# Patient Record
Sex: MALE | Race: WHITE | NOT HISPANIC OR LATINO | Employment: FULL TIME | URBAN - METROPOLITAN AREA
[De-identification: names, ages, dates, MRNs, and addresses within clinical notes are randomized per-mention and may not be internally consistent; named-entity substitution may affect disease eponyms.]

---

## 2024-05-29 ENCOUNTER — OFFICE VISIT (OUTPATIENT)
Dept: PRIMARY CARE CLINIC | Facility: CLINIC | Age: 19
End: 2024-05-29
Payer: MEDICARE

## 2024-05-29 VITALS
HEIGHT: 69 IN | BODY MASS INDEX: 16.52 KG/M2 | DIASTOLIC BLOOD PRESSURE: 68 MMHG | RESPIRATION RATE: 16 BRPM | TEMPERATURE: 98 F | HEART RATE: 72 BPM | SYSTOLIC BLOOD PRESSURE: 110 MMHG | OXYGEN SATURATION: 99 % | WEIGHT: 111.56 LBS

## 2024-05-29 DIAGNOSIS — R09.82 POSTNASAL DRIP: ICD-10-CM

## 2024-05-29 DIAGNOSIS — Z11.59 NEED FOR HEPATITIS C SCREENING TEST: ICD-10-CM

## 2024-05-29 DIAGNOSIS — Z00.00 HEALTH CARE MAINTENANCE: ICD-10-CM

## 2024-05-29 DIAGNOSIS — Z11.4 ENCOUNTER FOR SCREENING FOR HIV: ICD-10-CM

## 2024-05-29 DIAGNOSIS — J02.9 SORE THROAT: ICD-10-CM

## 2024-05-29 DIAGNOSIS — L55.9 SUNBURN: ICD-10-CM

## 2024-05-29 DIAGNOSIS — R63.6 LOW BODY WEIGHT DUE TO INADEQUATE CALORIC INTAKE: Primary | ICD-10-CM

## 2024-05-29 PROCEDURE — 99204 OFFICE O/P NEW MOD 45 MIN: CPT | Mod: PBBFAC,PN | Performed by: STUDENT IN AN ORGANIZED HEALTH CARE EDUCATION/TRAINING PROGRAM

## 2024-05-29 PROCEDURE — 99214 OFFICE O/P EST MOD 30 MIN: CPT | Mod: S$GLB,,, | Performed by: STUDENT IN AN ORGANIZED HEALTH CARE EDUCATION/TRAINING PROGRAM

## 2024-05-29 PROCEDURE — 99999 PR PBB SHADOW E&M-NEW PATIENT-LVL IV: CPT | Mod: PBBFAC,,, | Performed by: STUDENT IN AN ORGANIZED HEALTH CARE EDUCATION/TRAINING PROGRAM

## 2024-05-29 NOTE — PATIENT INSTRUCTIONS
Check up:   - 18 year old male presents to clinic today to be seen for general checkup.   - was advised by insurance that patient needed to be seen to continue with coverage.  Advised patient that his insurance would require a be established at another clinic, but could be seen today for a acute care visit.   - patient works at [employer].  States he has is high school diploma.  Lives with his grandmother.  Gets home from work at 10:00 p.m., frequently eats inadequate dinner and breakfast, while eating lunch at work.      Low body weight due to inadequate caloric intake   - discussed making sure to get increase variety in diet, encouraged vegetables as well as meats in addition to using multivitamin if feeling does not eat adequate diet.   - patient's growth chart show 41st percentile for height, but less than 0.5% for weight.  Advise could use boost or other supplements to help increase caloric intake if not getting adequate solid foods currently.   - physical exam generally reassuring, the patient is quite thin has sunburn at this time.   - will check patient's thyroid, A1c, blood counts and kidney liver function for possible causes of low body weight.    Postnasal drip/Sore throat:   - some sign of tonsilliths on left side.  Could monitor.  Discussed lemon candy or saltwater gargles with warm water.     Sunburn:   - patient has sunburn to arms legs and torso, states he fell asleep next to away full of the weekend and slept in the sun for 4 hours.

## 2024-05-29 NOTE — PROGRESS NOTES
"Subjective:           Patient ID: Bertin Reeves   Age:  18 y.o.  Sex: male     Chief Complaint:   post nasal drip and Sore Throat (Left side)      History of Present Illness:    Bertin Reeves is a 18 y.o. male who presents today with a chief complaint of post nasal drip and Sore Throat (Left side)  .    18 Year old male presenting to be seen for postnasal drip and left-sided sore throat.    Had interested in establishing care, however patient has Medicaid and we are not accepting new medication at this time.    Current weight is 111lb, was less before.    Does not eat good food much.    Took adderall before but off that now.      Eats some meat, or snacks.  Not really veggies.          Review of Systems   Constitutional: Negative.  Negative for fatigue and fever.   HENT: Negative.  Negative for congestion and rhinorrhea.    Eyes: Negative.    Respiratory: Negative.  Negative for cough, shortness of breath and wheezing.    Cardiovascular: Negative.  Negative for chest pain, palpitations and leg swelling.   Gastrointestinal: Negative.  Negative for constipation, diarrhea, nausea and vomiting.   Endocrine: Negative.    Genitourinary: Negative.  Negative for difficulty urinating, frequency and urgency.   Musculoskeletal: Negative.  Negative for arthralgias and gait problem.   Skin: Negative.    Allergic/Immunologic: Negative for food allergies.   Neurological:  Negative for weakness and headaches.   Psychiatric/Behavioral: Negative.  Negative for decreased concentration. The patient is not nervous/anxious.            Objective:        Vitals:    05/29/24 1001   BP: 110/68   BP Location: Right arm   Patient Position: Sitting   BP Method: Medium (Manual)   Pulse: 72   Resp: 16   Temp: 97.9 °F (36.6 °C)   TempSrc: Oral   SpO2: 99%   Weight: 50.6 kg (111 lb 8.8 oz)   Height: 5' 9" (1.753 m)       Body mass index is 16.47 kg/m².      Physical Exam  Constitutional:       Appearance: He is not toxic-appearing.      Comments: As " "per BMI.  Very thin, but normal proportions.    HENT:      Head: Normocephalic and atraumatic.      Right Ear: External ear normal.      Left Ear: External ear normal.      Nose: No congestion.      Mouth/Throat:      Mouth: Mucous membranes are moist.      Pharynx: Oropharynx is clear.   Eyes:      Extraocular Movements: Extraocular movements intact.      Conjunctiva/sclera: Conjunctivae normal.   Cardiovascular:      Rate and Rhythm: Normal rate and regular rhythm.      Heart sounds: No murmur heard.  Pulmonary:      Effort: Pulmonary effort is normal. No respiratory distress.      Breath sounds: No wheezing.   Abdominal:      General: Bowel sounds are normal.      Palpations: Abdomen is soft.   Musculoskeletal:         General: No swelling.      Cervical back: Normal range of motion.   Skin:     General: Skin is warm.      Capillary Refill: Capillary refill takes less than 2 seconds.      Coloration: Skin is not jaundiced.      Findings: Erythema present.      Comments: Body wide sun burn.   Neurological:      General: No focal deficit present.      Mental Status: He is alert and oriented to person, place, and time.      Motor: No weakness.   Psychiatric:         Mood and Affect: Mood normal.           No past medical history on file.    No results found for: "NA", "K", "CL", "CO2", "BUN", "CREATININE", "GLUCOSE", "ANIONGAP"  No results found for: "HGBA1C"  No results found for: "BNP", "BNPTRIAGEBLO"    No results found for: "WBC", "HGB", "HCT", "PLT", "GRAN"  No results found for: "CHOL", "HDL", "LDLCALC", "TRIG"     No outpatient encounter medications on file as of 5/29/2024.     No facility-administered encounter medications on file as of 5/29/2024.          Assessment:       1. Low body weight due to inadequate caloric intake    2. Postnasal drip    3. Sore throat    4. Sunburn    5. Health care maintenance    6. Need for hepatitis C screening test    7. Encounter for screening for HIV           Plan:     "     Check up:   - 18 year old male presents to clinic today to be seen for general checkup.   - was advised by insurance that patient needed to be seen to continue with coverage.  Advised patient that his insurance would require a be established at another clinic, but could be seen today for a acute care visit.   - patient works at [employer].  States he has is high school diploma.  Lives with his grandmother.  Gets home from work at 10:00 p.m., frequently eats inadequate dinner and breakfast, while eating lunch at work.      Low body weight due to inadequate caloric intake   - discussed making sure to get increase variety in diet, encouraged vegetables as well as meats in addition to using multivitamin if feeling does not eat adequate diet.   - patient's growth chart show 41st percentile for height, but less than 0.5% for weight.  Advise could use boost or other supplements to help increase caloric intake if not getting adequate solid foods currently.   - physical exam generally reassuring, the patient is quite thin has sunburn at this time.   - will check patient's thyroid, A1c, blood counts and kidney liver function for possible causes of low body weight.    Postnasal drip/Sore throat:   - some sign of tonsilliths on left side.  Could monitor.  Discussed lemon candy or saltwater gargles with warm water.     Sunburn:   - patient has sunburn to arms legs and torso, states he fell asleep next to away full of the weekend and slept in the sun for 4 hours.

## 2024-06-18 ENCOUNTER — TELEPHONE (OUTPATIENT)
Dept: PRIMARY CARE CLINIC | Facility: CLINIC | Age: 19
End: 2024-06-18
Payer: MEDICARE

## 2024-06-18 NOTE — TELEPHONE ENCOUNTER
Patients mother is wondering if you can give him something to increase his appetite.  She states he does not eat well.

## 2024-06-18 NOTE — TELEPHONE ENCOUNTER
----- Message from Krishna Monet MD sent at 5/30/2024 11:51 PM CDT -----  Patient's vitamin-D level 26, would recommend vitamin-D supplementation with vitamin D3 2000 IU daily    Patient not diabetic.  Patient blood counts normal.  Thyroid function normal.  Patient kidney and liver function normal.      Cholesterol reassuring, though lower than usually and may show some signs of inadequate nutrition.  Would advise increase calories and more nutrients in diet as was  discussed in visit.